# Patient Record
Sex: FEMALE | Race: WHITE | NOT HISPANIC OR LATINO | URBAN - METROPOLITAN AREA
[De-identification: names, ages, dates, MRNs, and addresses within clinical notes are randomized per-mention and may not be internally consistent; named-entity substitution may affect disease eponyms.]

---

## 2022-05-24 ENCOUNTER — OFFICE VISIT (OUTPATIENT)
Dept: URBAN - METROPOLITAN AREA CLINIC 115 | Facility: CLINIC | Age: 82
End: 2022-05-24

## 2022-06-17 ENCOUNTER — OFFICE VISIT (OUTPATIENT)
Dept: URBAN - METROPOLITAN AREA CLINIC 84 | Facility: CLINIC | Age: 82
End: 2022-06-17

## 2022-08-09 ENCOUNTER — WEB ENCOUNTER (OUTPATIENT)
Dept: URBAN - METROPOLITAN AREA CLINIC 115 | Facility: CLINIC | Age: 82
End: 2022-08-09

## 2022-08-09 ENCOUNTER — OFFICE VISIT (OUTPATIENT)
Dept: URBAN - METROPOLITAN AREA CLINIC 115 | Facility: CLINIC | Age: 82
End: 2022-08-09
Payer: MEDICARE

## 2022-08-09 VITALS
RESPIRATION RATE: 18 BRPM | WEIGHT: 192 LBS | TEMPERATURE: 96.6 F | SYSTOLIC BLOOD PRESSURE: 112 MMHG | DIASTOLIC BLOOD PRESSURE: 68 MMHG | HEIGHT: 68 IN | BODY MASS INDEX: 29.1 KG/M2 | HEART RATE: 84 BPM

## 2022-08-09 DIAGNOSIS — Z79.01 ANTICOAGULANT LONG-TERM USE: ICD-10-CM

## 2022-08-09 DIAGNOSIS — K52.9 CHRONIC DIARRHEA: ICD-10-CM

## 2022-08-09 DIAGNOSIS — R10.33 PERIUMBILICAL PAIN: ICD-10-CM

## 2022-08-09 DIAGNOSIS — K21.9 CHRONIC GERD: ICD-10-CM

## 2022-08-09 DIAGNOSIS — R10.30 LOWER ABDOMINAL PAIN: ICD-10-CM

## 2022-08-09 DIAGNOSIS — R63.4 WEIGHT LOSS: ICD-10-CM

## 2022-08-09 DIAGNOSIS — K64.8 INTERNAL HEMORRHOIDS: ICD-10-CM

## 2022-08-09 PROBLEM — 711150003: Status: ACTIVE | Noted: 2022-08-09

## 2022-08-09 PROBLEM — 235595009: Status: ACTIVE | Noted: 2022-08-09

## 2022-08-09 PROCEDURE — 99204 OFFICE O/P NEW MOD 45 MIN: CPT | Performed by: INTERNAL MEDICINE

## 2022-08-09 PROCEDURE — 74177 CT ABD & PELVIS W/CONTRAST: CPT | Performed by: INTERNAL MEDICINE

## 2022-08-09 RX ORDER — OMEPRAZOLE 40 MG/1
1 CAPSULE 30 MINUTES BEFORE MORNING MEAL CAPSULE, DELAYED RELEASE ORAL ONCE A DAY
Status: ACTIVE | COMMUNITY

## 2022-08-09 RX ORDER — OXYCODONE HYDROCHLORIDE AND ACETAMINOPHEN 2.5; 325 MG/1; MG/1
1 TABLET AS NEEDED TABLET ORAL
Status: ACTIVE | COMMUNITY

## 2022-08-09 RX ORDER — HYOSCYAMINE SULFATE 0.12 MG/1
1 TABLET UNDER THE TONGUE AND ALLOW TO DISSOLVE  AS NEEDED TABLET, ORALLY DISINTEGRATING ORAL
Qty: 60 | Refills: 1 | OUTPATIENT

## 2022-08-09 RX ORDER — WARFARIN SODIUM 5 MG/1
1 TABLET TABLET ORAL ONCE A DAY
Status: ACTIVE | COMMUNITY

## 2022-08-09 RX ORDER — OXYCODONE HYDROCHLORIDE AND ACETAMINOPHEN 10; 325 MG/1; MG/1
1 TABLET AS NEEDED TABLET ORAL
Status: ACTIVE | COMMUNITY

## 2022-08-09 RX ORDER — SIMVASTATIN 20 MG
1 TABLET IN THE EVENING TABLET ORAL ONCE A DAY
Status: ACTIVE | COMMUNITY

## 2022-08-09 RX ORDER — ATENOLOL 50 MG/1
1 TABLET TABLET ORAL ONCE A DAY
Status: ACTIVE | COMMUNITY

## 2022-08-09 RX ORDER — GABAPENTIN 400 MG/1
1 CAPSULE CAPSULE ORAL ONCE A DAY
Status: ACTIVE | COMMUNITY

## 2022-08-09 RX ORDER — DILTIAZEM HYDROCHLORIDE 240 MG/1
1 CAPSULE CAPSULE, EXTENDED RELEASE ORAL ONCE A DAY
Status: ACTIVE | COMMUNITY

## 2022-08-09 NOTE — HPI-TODAY'S VISIT:
EGD/Colonoscopy last year in [place], not avail. Chr diarrhea, years, limits ability to leave house, non bloody. coumadin for afib. periumb and lower abd crampy pains. chr gerd, controlled on omep 40 daily, and occ pill dysphagia, reports dilation at egd without relief. 10 lb weight loss.

## 2022-08-10 ENCOUNTER — TELEPHONE ENCOUNTER (OUTPATIENT)
Dept: URBAN - METROPOLITAN AREA CLINIC 118 | Facility: CLINIC | Age: 82
End: 2022-08-10

## 2022-08-11 LAB
A/G RATIO: 1.5
ABSOLUTE BASOPHILS: 23
ABSOLUTE EOSINOPHILS: 109
ABSOLUTE LYMPHOCYTES: 2457
ABSOLUTE MONOCYTES: 741
ABSOLUTE NEUTROPHILS: 4469
ALBUMIN: 4
ALKALINE PHOSPHATASE: 93
ALT (SGPT): 12
AST (SGOT): 16
BASOPHILS: 0.3
BILIRUBIN, TOTAL: 0.5
BUN/CREATININE RATIO: (no result)
BUN: 14
C-REACTIVE PROTEIN, QUANT: 6.8
CALCIUM: 9.1
CARBON DIOXIDE, TOTAL: 25
CHLORIDE: 103
CREATININE: 0.95
EGFR: 61
EOSINOPHILS: 1.4
GLOBULIN, TOTAL: 2.6
GLUCOSE: 89
HEMATOCRIT: 39.6
HEMOGLOBIN: 12.6
IMMUNOGLOBULIN A, QN, SERUM: 267
INTERPRETATION: (no result)
LIPASE: 13
LYMPHOCYTES: 31.5
MCH: 25.7
MCHC: 31.8
MCV: 80.7
MONOCYTES: 9.5
MPV: 9.8
NEUTROPHILS: 57.3
PLATELET COUNT: 446
POTASSIUM: 4.2
PROTEIN, TOTAL: 6.6
RDW: 15.4
RED BLOOD CELL COUNT: 4.91
SODIUM: 138
T-TRANSGLUTAMINASE (TTG) IGA: <1
TSH W/REFLEX TO FT4: 3.64
WHITE BLOOD CELL COUNT: 7.8

## 2022-08-15 ENCOUNTER — LAB OUTSIDE AN ENCOUNTER (OUTPATIENT)
Dept: URBAN - METROPOLITAN AREA CLINIC 115 | Facility: CLINIC | Age: 82
End: 2022-08-15

## 2022-08-21 LAB — GASTROINTESTINAL PATHOGEN: (no result)

## 2022-08-26 ENCOUNTER — TELEPHONE ENCOUNTER (OUTPATIENT)
Dept: URBAN - METROPOLITAN AREA CLINIC 115 | Facility: CLINIC | Age: 82
End: 2022-08-26

## 2022-09-08 ENCOUNTER — TELEPHONE ENCOUNTER (OUTPATIENT)
Dept: URBAN - METROPOLITAN AREA CLINIC 115 | Facility: CLINIC | Age: 82
End: 2022-09-08

## 2022-09-08 RX ORDER — DICYCLOMINE HYDROCHLORIDE 20 MG/1
1 TABLET TABLET ORAL
Qty: 90 | Refills: 1 | OUTPATIENT

## 2022-09-20 ENCOUNTER — LAB OUTSIDE AN ENCOUNTER (OUTPATIENT)
Dept: URBAN - METROPOLITAN AREA CLINIC 115 | Facility: CLINIC | Age: 82
End: 2022-09-20

## 2022-09-20 LAB
CREATININE POC: 0.6
PERFORMING LAB: (no result)

## 2022-11-11 ENCOUNTER — OFFICE VISIT (OUTPATIENT)
Dept: URBAN - METROPOLITAN AREA CLINIC 115 | Facility: CLINIC | Age: 82
End: 2022-11-11
Payer: MEDICARE

## 2022-11-11 ENCOUNTER — DASHBOARD ENCOUNTERS (OUTPATIENT)
Age: 82
End: 2022-11-11

## 2022-11-11 VITALS
TEMPERATURE: 94.6 F | HEIGHT: 68 IN | SYSTOLIC BLOOD PRESSURE: 136 MMHG | HEART RATE: 95 BPM | WEIGHT: 190.6 LBS | DIASTOLIC BLOOD PRESSURE: 80 MMHG | BODY MASS INDEX: 28.89 KG/M2

## 2022-11-11 DIAGNOSIS — R10.30 LOWER ABDOMINAL PAIN: ICD-10-CM

## 2022-11-11 DIAGNOSIS — K58.2 IRRITABLE BOWEL SYNDROME WITH BOTH CONSTIPATION AND DIARRHEA: ICD-10-CM

## 2022-11-11 DIAGNOSIS — R93.5 ABNORMAL CT OF THE ABDOMEN: ICD-10-CM

## 2022-11-11 PROBLEM — 10743008: Status: ACTIVE | Noted: 2022-11-11

## 2022-11-11 PROCEDURE — 99213 OFFICE O/P EST LOW 20 MIN: CPT | Performed by: INTERNAL MEDICINE

## 2022-11-11 RX ORDER — OXYCODONE HYDROCHLORIDE AND ACETAMINOPHEN 10; 325 MG/1; MG/1
1 TABLET AS NEEDED TABLET ORAL
Status: ACTIVE | COMMUNITY

## 2022-11-11 RX ORDER — OMEPRAZOLE 40 MG/1
1 CAPSULE 30 MINUTES BEFORE MORNING MEAL CAPSULE, DELAYED RELEASE ORAL ONCE A DAY
Status: ACTIVE | COMMUNITY

## 2022-11-11 RX ORDER — WARFARIN SODIUM 5 MG/1
1 TABLET TABLET ORAL ONCE A DAY
Status: ACTIVE | COMMUNITY

## 2022-11-11 RX ORDER — OXYCODONE HYDROCHLORIDE AND ACETAMINOPHEN 2.5; 325 MG/1; MG/1
1 TABLET AS NEEDED TABLET ORAL
Status: ACTIVE | COMMUNITY

## 2022-11-11 RX ORDER — GABAPENTIN 400 MG/1
1 CAPSULE CAPSULE ORAL ONCE A DAY
Status: ACTIVE | COMMUNITY

## 2022-11-11 RX ORDER — HYOSCYAMINE SULFATE 0.12 MG/1
1 TABLET UNDER THE TONGUE AND ALLOW TO DISSOLVE  AS NEEDED TABLET, ORALLY DISINTEGRATING ORAL
Qty: 60 | Refills: 1 | Status: ACTIVE | COMMUNITY

## 2022-11-11 RX ORDER — DILTIAZEM HYDROCHLORIDE 240 MG/1
1 CAPSULE CAPSULE, EXTENDED RELEASE ORAL ONCE A DAY
Status: ACTIVE | COMMUNITY

## 2022-11-11 RX ORDER — DICYCLOMINE HYDROCHLORIDE 20 MG/1
1 TABLET TABLET ORAL
Qty: 90 | Refills: 1 | Status: ACTIVE | COMMUNITY

## 2022-11-11 RX ORDER — ATENOLOL 50 MG/1
1 TABLET TABLET ORAL ONCE A DAY
Status: ACTIVE | COMMUNITY

## 2022-11-11 RX ORDER — SIMVASTATIN 20 MG
1 TABLET IN THE EVENING TABLET ORAL ONCE A DAY
Status: ACTIVE | COMMUNITY

## 2022-11-11 RX ORDER — DICYCLOMINE HYDROCHLORIDE 20 MG/1
1 TABLET TABLET ORAL
Qty: 60 | Refills: 1

## 2022-11-11 NOTE — HPI-TODAY'S VISIT:
Labs normal. GPP without infection. Now with constipation issues. Lower abd pain, rad from lower back, seems to involve sciatica. CT enterography, normal GI, left renal lesion (sees urology) and 3.3cm fluid collection at spinal S1 laminectomy. Prior hx: EGD/Colonoscopy last year in [place], not avail. Chr diarrhea, years, limits ability to leave house, non bloody. coumadin for afib. periumb and lower abd crampy pains. chr gerd, controlled on omep 40 daily, and occ pill dysphagia, reports dilation at egd without relief. 10 lb weight loss.

## 2022-11-11 NOTE — PHYSICAL EXAM CONSTITUTIONAL:
well developed, well nourished , in no acute distress , ambulating without difficulty , normal communication ability DISCHARGE